# Patient Record
Sex: MALE | Race: WHITE | Employment: FULL TIME | URBAN - METROPOLITAN AREA
[De-identification: names, ages, dates, MRNs, and addresses within clinical notes are randomized per-mention and may not be internally consistent; named-entity substitution may affect disease eponyms.]

---

## 2018-03-13 DIAGNOSIS — M25.512 LEFT SHOULDER PAIN, UNSPECIFIED CHRONICITY: Primary | ICD-10-CM

## 2018-03-20 DIAGNOSIS — M25.562 LEFT KNEE PAIN, UNSPECIFIED CHRONICITY: Primary | ICD-10-CM

## 2018-06-07 DIAGNOSIS — M54.2 CERVICAL SPINE PAIN: Primary | ICD-10-CM

## 2018-06-14 ENCOUNTER — TELEPHONE (OUTPATIENT)
Dept: ORTHOPEDIC SURGERY | Age: 53
End: 2018-06-14

## 2025-06-21 ENCOUNTER — OFFICE VISIT (OUTPATIENT)
Dept: URGENT CARE | Age: 60
End: 2025-06-21

## 2025-06-21 VITALS
WEIGHT: 174.6 LBS | BODY MASS INDEX: 24.44 KG/M2 | DIASTOLIC BLOOD PRESSURE: 81 MMHG | OXYGEN SATURATION: 95 % | HEART RATE: 93 BPM | HEIGHT: 71 IN | TEMPERATURE: 97.7 F | SYSTOLIC BLOOD PRESSURE: 120 MMHG

## 2025-06-21 DIAGNOSIS — N49.9 CELLULITIS OF MALE GENITALIA: ICD-10-CM

## 2025-06-21 DIAGNOSIS — R21 RASH OF GENITAL AREA: Primary | ICD-10-CM

## 2025-06-21 LAB
BILIRUBIN, POC: NEGATIVE
BLOOD URINE, POC: NEGATIVE
CLARITY, POC: CLEAR
COLOR, POC: YELLOW
GLUCOSE URINE, POC: NEGATIVE MG/DL
KETONES, POC: 15 MG/DL
LEUKOCYTE EST, POC: NEGATIVE
NITRITE, POC: NEGATIVE
PH, POC: 6
PROTEIN, POC: NEGATIVE MG/DL
SPECIFIC GRAVITY, POC: <=1.005
UROBILINOGEN, POC: ABNORMAL

## 2025-06-21 RX ORDER — FLUCONAZOLE 150 MG/1
150 TABLET ORAL WEEKLY
Qty: 4 TABLET | Refills: 0 | Status: SHIPPED | OUTPATIENT
Start: 2025-06-21 | End: 2025-06-21

## 2025-06-21 RX ORDER — CLOTRIMAZOLE 1 %
CREAM (GRAM) TOPICAL
COMMUNITY
Start: 2025-06-10

## 2025-06-21 RX ORDER — DOXYCYCLINE HYCLATE 100 MG
100 TABLET ORAL 2 TIMES DAILY
Qty: 20 TABLET | Refills: 0 | Status: SHIPPED | OUTPATIENT
Start: 2025-06-21 | End: 2025-07-01

## 2025-06-21 RX ORDER — CEPHALEXIN 500 MG/1
500 CAPSULE ORAL 4 TIMES DAILY
COMMUNITY
Start: 2025-06-03

## 2025-06-21 RX ORDER — DOXYCYCLINE HYCLATE 100 MG
100 TABLET ORAL 2 TIMES DAILY
Qty: 20 TABLET | Refills: 0 | Status: SHIPPED | OUTPATIENT
Start: 2025-06-21 | End: 2025-06-21

## 2025-06-21 RX ORDER — FLUCONAZOLE 150 MG/1
150 TABLET ORAL WEEKLY
Qty: 4 TABLET | Refills: 0 | Status: SHIPPED | OUTPATIENT
Start: 2025-06-21 | End: 2025-07-21

## 2025-06-21 NOTE — ASSESSMENT & PLAN NOTE
New, not at goal (unstable), Failed Keflex therapy. Appears to be co-current with mentioned genital rash, especially at the perineum with tender, draining erythematous lesion. Start 10-day course Doxycycline 100 mg BID.

## 2025-06-21 NOTE — ASSESSMENT & PLAN NOTE
New, not at goal (unstable), Failed initial treatment with topical clotrimazole. Appears to have spreading genital rash now including on the tip of penis. Start PO Diflucan 150 mg weekly for 4 doses.

## 2025-06-21 NOTE — PROGRESS NOTES
Chief Complaint   Patient presents with    Rash     Patient complains of a rash between his anus and testicles, \"looks like I am getting something on the head of my penis as well.\" Possible hemorrhoid to straining that occurred back in September. Current symptoms ongoing since Nov., went to PCP and prescribed antifungal (no improvement) and oral antibiotic (\"no change\") - also started on metamucil, but feels even more constipated. Took magnesium citrate yesterday and had a BM last night. Has not been able to eat for 2 days. Now using baby powder.          ASSESSMENT/PLAN    Problem List           Diagnosed       Musculoskeletal and Integument    Rash of genital area - Primary      New, not at goal (unstable), Failed initial treatment with topical clotrimazole. Appears to have spreading genital rash now including on the tip of penis. Start PO Diflucan 150 mg weekly for 4 doses.         Relevant Medications    fluconazole (DIFLUCAN) 150 MG tablet    doxycycline hyclate (VIBRA-TABS) 100 MG tablet    Other Relevant Orders    POCT Urinalysis no Micro (Completed)    Culture, Urine       Other    Cellulitis of male genitalia      New, not at goal (unstable), Failed Keflex therapy. Appears to be co-current with mentioned genital rash, especially at the perineum with tender, draining erythematous lesion. Start 10-day course Doxycycline 100 mg BID.         Relevant Medications    fluconazole (DIFLUCAN) 150 MG tablet    doxycycline hyclate (VIBRA-TABS) 100 MG tablet    Other Relevant Orders    Culture, Urine         Patient was recommended to follow up with annual well health exam.    No follow-ups on file.    Subjective   Bebeto Unger is a 60 y.o. male being seen in clinic for genital rash. Onset of symptom: about 2 weeks. Symptoms include worsening rash of scrotum, perineum, and tip of penis. Denied urinary symptoms, but does report some soreness, pruritus, and drainage especially at perineum. Underwent combo therapy with

## 2025-06-23 ENCOUNTER — RESULTS FOLLOW-UP (OUTPATIENT)
Dept: URGENT CARE | Age: 60
End: 2025-06-23

## 2025-06-23 LAB — BACTERIA UR CULT: NORMAL
